# Patient Record
Sex: FEMALE
[De-identification: names, ages, dates, MRNs, and addresses within clinical notes are randomized per-mention and may not be internally consistent; named-entity substitution may affect disease eponyms.]

---

## 2019-09-17 ENCOUNTER — APPOINTMENT (OUTPATIENT)
Dept: ORTHOPEDIC SURGERY | Facility: CLINIC | Age: 44
End: 2019-09-17
Payer: COMMERCIAL

## 2019-09-17 VITALS — WEIGHT: 126 LBS | HEIGHT: 66 IN | BODY MASS INDEX: 20.25 KG/M2

## 2019-09-17 PROBLEM — Z00.00 ENCOUNTER FOR PREVENTIVE HEALTH EXAMINATION: Status: ACTIVE | Noted: 2019-09-17

## 2019-09-17 PROCEDURE — 99204 OFFICE O/P NEW MOD 45 MIN: CPT

## 2019-09-17 PROCEDURE — 73562 X-RAY EXAM OF KNEE 3: CPT | Mod: LT

## 2019-09-17 NOTE — PHYSICAL EXAM
[de-identified] : Left knee shows no warmth or swelling with a full range of motion. There is some tenderness over the lateral aspect of the knee over the iliotibial band. There is a negative Jasbir test. There is no evidence of instability. Neurovascular exam is normal full range of motion [de-identified] : Left knee x-ray shows no fracture dislocation

## 2019-09-17 NOTE — REVIEW OF SYSTEMS
[Arthralgia] : arthralgia [Joint Pain] : no joint pain [Joint Stiffness] : no joint stiffness [Negative] : Endocrine

## 2019-09-17 NOTE — HISTORY OF PRESENT ILLNESS
[de-identified] : Location: left knee\par Quality: aching, throbbing\par Duration: 9/15/2019\par Context: post run \par Aggravating Factors: walking, running \par Conservative treatment: Ice\par Associated Symptoms: N.a\par Prior Studies: n.a\par

## 2019-09-30 ENCOUNTER — RX RENEWAL (OUTPATIENT)
Age: 44
End: 2019-09-30

## 2019-09-30 DIAGNOSIS — M23.92 UNSPECIFIED INTERNAL DERANGEMENT OF LEFT KNEE: ICD-10-CM

## 2019-09-30 RX ORDER — METHYLPREDNISOLONE 4 MG/1
4 TABLET ORAL
Qty: 21 | Refills: 2 | Status: ACTIVE | COMMUNITY
Start: 2019-09-30 | End: 1900-01-01